# Patient Record
Sex: MALE | Race: WHITE
[De-identification: names, ages, dates, MRNs, and addresses within clinical notes are randomized per-mention and may not be internally consistent; named-entity substitution may affect disease eponyms.]

---
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
## 2019-08-19 ENCOUNTER — HOSPITAL ENCOUNTER (EMERGENCY)
Dept: HOSPITAL 53 - M ED | Age: 19
Discharge: HOME | End: 2019-08-19
Payer: COMMERCIAL

## 2019-08-19 VITALS — DIASTOLIC BLOOD PRESSURE: 71 MMHG | SYSTOLIC BLOOD PRESSURE: 169 MMHG

## 2019-08-19 VITALS — HEIGHT: 71 IN | BODY MASS INDEX: 36.48 KG/M2 | WEIGHT: 260.54 LBS

## 2019-08-19 DIAGNOSIS — H60.91: ICD-10-CM

## 2019-08-19 DIAGNOSIS — H66.91: Primary | ICD-10-CM

## 2024-11-15 ENCOUNTER — HOSPITAL ENCOUNTER (OUTPATIENT)
Dept: HOSPITAL 53 - M LAB REF | Age: 24
End: 2024-11-15
Attending: INTERNAL MEDICINE
Payer: COMMERCIAL

## 2024-11-15 DIAGNOSIS — R94.5: Primary | ICD-10-CM

## 2024-11-15 LAB
FERRITIN SERPL-MCNC: 72.8 NG/ML (ref 10.5–307.3)
HBV CORE IGM SER QL: NEGATIVE
HBV SURFACE AB SER-ACNC: NEGATIVE M[IU]/ML
HCV AB SER QL: 0.02 INDEX (ref ?–0.8)
IRON SERPL-MCNC: 78 UG/DL (ref 65–175)

## 2024-11-18 LAB — CERULOPLASMIN SERPL-MCNC: 28 MG/DL (ref 14–30)

## 2024-11-19 LAB — ANA SER QL IF: NEGATIVE

## 2025-02-14 ENCOUNTER — HOSPITAL ENCOUNTER (OUTPATIENT)
Dept: HOSPITAL 53 - M RAD | Age: 25
End: 2025-02-14
Attending: INTERNAL MEDICINE
Payer: COMMERCIAL

## 2025-02-14 DIAGNOSIS — R74.01: Primary | ICD-10-CM

## 2025-04-16 ENCOUNTER — HOSPITAL ENCOUNTER (OUTPATIENT)
Dept: HOSPITAL 53 - M LAB REF | Age: 25
End: 2025-04-16
Attending: INTERNAL MEDICINE
Payer: COMMERCIAL

## 2025-04-16 DIAGNOSIS — N52.9: Primary | ICD-10-CM

## 2025-04-16 LAB
FSH SERPL-ACNC: 3.2 MIU/ML (ref 1.4–18.1)
PROLACTIN SERPL-MCNC: 5.42 NG/ML (ref 2.1–17.7)